# Patient Record
Sex: MALE | URBAN - METROPOLITAN AREA
[De-identification: names, ages, dates, MRNs, and addresses within clinical notes are randomized per-mention and may not be internally consistent; named-entity substitution may affect disease eponyms.]

---

## 2017-01-03 ENCOUNTER — APPOINTMENT (RX ONLY)
Dept: URBAN - METROPOLITAN AREA CLINIC 2 | Facility: CLINIC | Age: 42
Setting detail: DERMATOLOGY
End: 2017-01-03

## 2017-01-03 DIAGNOSIS — L81.4 OTHER MELANIN HYPERPIGMENTATION: ICD-10-CM

## 2017-01-03 DIAGNOSIS — D22 MELANOCYTIC NEVI: ICD-10-CM

## 2017-01-03 PROBLEM — D22.9 MELANOCYTIC NEVI, UNSPECIFIED: Status: ACTIVE | Noted: 2017-01-03

## 2017-01-03 PROBLEM — L57.8 OTHER SKIN CHANGES DUE TO CHRONIC EXPOSURE TO NONIONIZING RADIATION: Status: ACTIVE | Noted: 2017-01-03

## 2017-01-03 PROCEDURE — ? PATHOLOGY DISCUSSION

## 2017-01-03 PROCEDURE — ? COUNSELING

## 2017-01-03 PROCEDURE — ? SEPARATE AND IDENTIFIABLE DOCUMENTATION

## 2017-01-03 PROCEDURE — 99213 OFFICE O/P EST LOW 20 MIN: CPT

## 2024-11-15 ENCOUNTER — HOSPITAL ENCOUNTER (OUTPATIENT)
Age: 49
Discharge: HOME | End: 2024-11-15
Payer: COMMERCIAL

## 2024-11-15 DIAGNOSIS — D72.819: ICD-10-CM

## 2024-11-15 DIAGNOSIS — D64.9: Primary | ICD-10-CM

## 2024-11-15 LAB
ALANINE AMINOTRANSFERASE: 39 U/L (ref 10–49)
ALBUMIN, SERUM: 4.2 GM/DL (ref 3.5–5)
ALBUMIN/GLOBULIN RATIO: 1.8 (ref 1.2–2.2)
ALKALINE PHOSPHATASE: 68 U/L (ref 46–116)
ANION GAP: 3 (ref 7–16)
ASPARTATE AMINO TRANSFERASE: 21 U/L (ref 0–34)
BASOPHILS # (AUTO): 0.1 THOU/MM3 (ref 0–0.2)
BASOPHILS % (AUTO): 1 % (ref 0–2.5)
BILIRUBIN,TOTAL: 3.4 MG/DL (ref 0.3–1.2)
BLOOD UREA NITROGEN: 16 MG/DL (ref 9–23)
BUN/CREATININE RATIO: 16 RATIO (ref 12–20)
CALCIUM (CORRECTED): 9.3 MG/DL (ref 8.5–10.1)
CALCIUM: 9.3 MG/DL (ref 8.3–10.6)
CARBON DIOXIDE: 27.7 MMOL/L (ref 20–31)
CHLORIDE: 108 MMOL/L (ref 98–107)
CREATININE (COMPONENT): 1 MG/DL (ref 0.6–1.3)
EGFR: > 60 SEE NOTE
EOSINOPHILS # (AUTO): 0.1 THOU/MM3 (ref 0–0.5)
EOSINOPHILS % (AUTO): 3 % (ref 0–10)
GLOBULIN: 2.4 GM/DL (ref 2.3–3.5)
GLUCOSE: 92 MG/DL (ref 74–106)
HEMATOCRIT: 37.5 % (ref 41–53)
HEMOGLOBIN: 13.8 G/DL (ref 13.5–16)
IMM GRANULOCYTES # BLD AUTO: 0 THOU/MM3 (ref 0–0)
IMMATURE GRANULOCYTES % (AUTO): 0 % (ref 0–0)
LYMPHOCYTES # (AUTO): 1.4 THOU/MM3 (ref 1–4.8)
LYMPHOCYTES % (AUTO): 40 % (ref 10–50)
MEAN CORPUSCULAR HEMOGLOBIN: 32 PG (ref 25–35)
MEAN CORPUSCULAR HGB CONC: 36.8 G/DL (ref 31–37)
MEAN CORPUSCULAR VOLUME: 87 FL (ref 80–100)
MONOCYTES # (AUTO): 0.3 THOU/MM3 (ref 0–0.8)
MONOCYTES % (AUTO): 9 % (ref 0–12)
NEUTROPHILS # (AUTO): 1.6 THOU/MM3 (ref 1.8–7.7)
NEUTROPHILS % (AUTO): 47 % (ref 37–80)
NRBC BLD-RTO: 0 /100 WBC
NUCLEATED RED BLOOD CELL #: 0 THOU/MM3 (ref 0–0)
OSMOLALITY,CALCULATED: 278 (ref 275–295)
PLATELET COUNT: 162 THOU/MM3 (ref 140–440)
POTASSIUM: 4.7 MMOL/L (ref 3.4–5.1)
RDW STANDARD DEVIATION: 40.1 FL (ref 35.1–43.9)
RED BLOOD COUNT: 4.31 MILN/MM3 (ref 4.5–5.9)
SODIUM: 139 MMOL/L (ref 136–145)
TOTAL PROTEIN: 6.6 GM/DL (ref 5.7–8.2)
WHITE BLOOD COUNT: 3.5 THOU/MM3 (ref 3.8–10.6)

## 2024-11-15 PROCEDURE — 80053 COMPREHEN METABOLIC PANEL: CPT

## 2024-11-15 PROCEDURE — 36415 COLL VENOUS BLD VENIPUNCTURE: CPT

## 2024-11-15 PROCEDURE — 85025 COMPLETE CBC W/AUTO DIFF WBC: CPT

## 2024-11-19 ENCOUNTER — HOSPITAL ENCOUNTER (OUTPATIENT)
Dept: HOSPITAL 104 - SCTC | Age: 49
LOS: 11 days | Discharge: HOME | End: 2024-11-30
Payer: COMMERCIAL

## 2024-11-19 DIAGNOSIS — E80.6: ICD-10-CM

## 2024-11-19 DIAGNOSIS — D72.819: ICD-10-CM

## 2024-11-19 DIAGNOSIS — D64.9: Primary | ICD-10-CM

## 2024-11-19 DIAGNOSIS — E80.4: ICD-10-CM

## 2024-11-19 PROCEDURE — 99212 OFFICE O/P EST SF 10 MIN: CPT

## 2024-11-19 PROCEDURE — G0463 HOSPITAL OUTPT CLINIC VISIT: HCPCS

## 2024-12-13 ENCOUNTER — HOSPITAL ENCOUNTER (OUTPATIENT)
Age: 49
Discharge: HOME | End: 2024-12-13
Payer: COMMERCIAL

## 2024-12-13 DIAGNOSIS — D64.9: Primary | ICD-10-CM

## 2024-12-13 DIAGNOSIS — D72.829: ICD-10-CM

## 2024-12-13 LAB
BILIRUB CONJ SERPL-MCNC: 0.9 MG/DL (ref 0–0.3)
BILIRUBIN,DIRECT: 0.9 MG/DL (ref 0–0.3)
BILIRUBIN,TOTAL: 3.1 MG/DL (ref 0.3–1.2)
FERRITIN: 108 NG/ML (ref 10.5–307.3)
FOLATE: 18.71 NG/ML (ref 5.38–?)
HGB RETIC QN AUTO: 37.5 PG (ref 28–35)
IMMATURE RETICULOCYTE FRACTION: 7.7 % (ref 2.3–13.4)
IRON SATN MFR SERPL: 31 % (ref 20–55)
IRON: 97 MCG/DL (ref 65–175)
LDH (LACTATE DEHYDROGENASE): 185 U/L (ref 120–246)
RETICULOCYTE % (AUTO): 1.4 % (ref 0.5–1.5)
RETICULOCYTE ABSOLUTE AUTO: 60.3 BILN/L (ref 25–75)
TOTAL IRON BINDING CAPACITY: 305 MCG/DL (ref 250–425)
UNSATURATED IRON BINDING: 208 (ref 225–295)
VITAMIN B12: 401 PG/ML (ref 211–911)

## 2024-12-13 PROCEDURE — 83010 ASSAY OF HAPTOGLOBIN QUANT: CPT

## 2024-12-13 PROCEDURE — 82247 BILIRUBIN TOTAL: CPT

## 2024-12-13 PROCEDURE — 85014 HEMATOCRIT: CPT

## 2024-12-13 PROCEDURE — 82248 BILIRUBIN DIRECT: CPT

## 2024-12-13 PROCEDURE — 36415 COLL VENOUS BLD VENIPUNCTURE: CPT

## 2024-12-13 PROCEDURE — 82607 VITAMIN B-12: CPT

## 2024-12-13 PROCEDURE — 85041 AUTOMATED RBC COUNT: CPT

## 2024-12-13 PROCEDURE — 82728 ASSAY OF FERRITIN: CPT

## 2024-12-13 PROCEDURE — 83540 ASSAY OF IRON: CPT

## 2024-12-13 PROCEDURE — 83550 IRON BINDING TEST: CPT

## 2024-12-13 PROCEDURE — 85018 HEMOGLOBIN: CPT

## 2024-12-13 PROCEDURE — 82746 ASSAY OF FOLIC ACID SERUM: CPT

## 2024-12-13 PROCEDURE — 84403 ASSAY OF TOTAL TESTOSTERONE: CPT

## 2024-12-13 PROCEDURE — 83615 LACTATE (LD) (LDH) ENZYME: CPT

## 2024-12-13 PROCEDURE — 83020 HEMOGLOBIN ELECTROPHORESIS: CPT

## 2024-12-13 PROCEDURE — 85046 RETICYTE/HGB CONCENTRATE: CPT

## 2024-12-20 LAB
HAPTOGLOBIN*: <10 MG/DL (ref 43–212)
HEMOGLOBINOPATHY MCH: 31.8 PG (ref 27–33)
HEMOGLOBINOPATHY MCV: 93.6 FL (ref 80–100)
HEMOGLOBINOPATHY RDW: 12.9 % (ref 11–15)
Lab: (no result)
Lab: 0 % (ref ?–2)
Lab: 13.4 G/DL (ref 13.2–17.1)
Lab: 2.6 % (ref 2–3.2)
Lab: 39.4 % (ref 38.5–50)
Lab: 4.21 MILLION/UL (ref 4.2–5.8)
Lab: 97.4 %
TESTOSTERONE,TOTAL*: 841 NG/DL (ref 250–1100)

## 2024-12-26 ENCOUNTER — HOSPITAL ENCOUNTER (OUTPATIENT)
Dept: HOSPITAL 104 - CDIM | Age: 49
Discharge: HOME | End: 2024-12-26
Payer: COMMERCIAL

## 2024-12-26 DIAGNOSIS — D64.9: Primary | ICD-10-CM

## 2024-12-26 PROCEDURE — 76700 US EXAM ABDOM COMPLETE: CPT

## 2024-12-26 NOTE — XR_ITS
Examination: 
  
Abdomen sonogram, complete  
  
Date and time of exam: December 26, 2024 0716 hours 
  
INDICATIONS: Abdominal pain history, cholecystectomy March 2024. 
  
Technique: 
Multiple real-time grayscale transabdominal sonographic images of the abdomen 
have been obtained. 
  
Findings: 
  
Absent gallbladder 
Normal common bile duct 0.3 cm 
Pancreatic head 2.2 cm 
Aorta not enlarged 
Liver normal size 13.1 cm 
Normal hepatopedal portal venous flow 
Patent IVC 
Right kidney 10.4 x 6.4 x 6.3 cm cortex 1.3 cm 
Left kidney 11.0 x 4.7 x 5.6 cm renal cortex 1.2 cm 
Mild to moderate bilateral renal parenchymal scar formation 
13 mm cyst anterior left kidney 
No hydronephrosis 
Spleen 9.5 cm 
  
IMPRESSION: 
  
Negative for hepatosplenomegaly

## 2024-12-31 ENCOUNTER — HOSPITAL ENCOUNTER (OUTPATIENT)
Dept: HOSPITAL 104 - SCTC | Age: 49
Discharge: HOME | End: 2024-12-31
Payer: COMMERCIAL

## 2024-12-31 DIAGNOSIS — E80.6: ICD-10-CM

## 2024-12-31 DIAGNOSIS — Z86.2: ICD-10-CM

## 2024-12-31 DIAGNOSIS — Z09: Primary | ICD-10-CM

## 2024-12-31 DIAGNOSIS — E80.4: ICD-10-CM

## 2024-12-31 PROCEDURE — 99212 OFFICE O/P EST SF 10 MIN: CPT

## 2024-12-31 PROCEDURE — G0463 HOSPITAL OUTPT CLINIC VISIT: HCPCS

## 2025-02-13 ENCOUNTER — HOSPITAL ENCOUNTER (OUTPATIENT)
Dept: HOSPITAL 104 - SCTC | Age: 50
LOS: 15 days | Discharge: HOME | End: 2025-02-28
Payer: COMMERCIAL

## 2025-02-13 ENCOUNTER — HOSPITAL ENCOUNTER (OUTPATIENT)
Dept: HOSPITAL 104 - SCTO | Age: 50
Discharge: HOME | End: 2025-02-13
Payer: COMMERCIAL

## 2025-02-13 DIAGNOSIS — D64.9: Primary | ICD-10-CM

## 2025-02-13 DIAGNOSIS — E80.4: ICD-10-CM

## 2025-02-13 DIAGNOSIS — D72.829: ICD-10-CM

## 2025-02-13 DIAGNOSIS — D72.819: Primary | ICD-10-CM

## 2025-02-13 LAB
ALANINE AMINOTRANSFERASE: 51 U/L (ref 10–49)
ALBUMIN, SERUM: 4.3 GM/DL (ref 3.5–5)
ALBUMIN/GLOBULIN RATIO: 1.7 (ref 1.2–2.2)
ALKALINE PHOSPHATASE: 67 U/L (ref 46–116)
ANION GAP: 6 (ref 7–16)
ASPARTATE AMINO TRANSFERASE: 30 U/L (ref 0–34)
BASOPHILS # (AUTO): 0 THOU/MM3 (ref 0–0.2)
BASOPHILS % (AUTO): 1 % (ref 0–2.5)
BILIRUBIN,TOTAL: 4.4 MG/DL (ref 0.3–1.2)
BLOOD UREA NITROGEN: 17 MG/DL (ref 9–23)
BUN/CREATININE RATIO: 15 RATIO (ref 12–20)
CALCIUM (CORRECTED): 9.8 MG/DL (ref 8.5–10.1)
CALCIUM: 9.8 MG/DL (ref 8.3–10.6)
CARBON DIOXIDE: 28.8 MMOL/L (ref 20–31)
CHLORIDE: 109 MMOL/L (ref 98–107)
CREATININE (COMPONENT): 1.1 MG/DL (ref 0.6–1.3)
EGFR: > 60 SEE NOTE
EOSINOPHILS # (AUTO): 0.1 THOU/MM3 (ref 0–0.5)
EOSINOPHILS % (AUTO): 2 % (ref 0–10)
FERRITIN: 117 NG/ML (ref 10.5–307.3)
FOLATE: 16.77 NG/ML (ref 5.38–?)
GLOBULIN: 2.5 GM/DL (ref 2.3–3.5)
GLUCOSE: 96 MG/DL (ref 74–106)
HEMATOCRIT: 37.9 % (ref 41–53)
HEMOGLOBIN: 14 G/DL (ref 13.5–16)
HGB RETIC QN AUTO: 35.6 PG (ref 28–35)
IMM GRANULOCYTES # BLD AUTO: 0.01 THOU/MM3 (ref 0–0)
IMMATURE GRANULOCYTES % (AUTO): 0 % (ref 0–0)
IMMATURE RETICULOCYTE FRACTION: 5.6 % (ref 2.3–13.4)
IRON SATN MFR SERPL: 67 % (ref 20–55)
IRON: 214 MCG/DL (ref 65–175)
LDH (LACTATE DEHYDROGENASE): 168 U/L (ref 120–246)
LYMPHOCYTES # (AUTO): 1.4 THOU/MM3 (ref 1–4.8)
LYMPHOCYTES % (AUTO): 39 % (ref 10–50)
MEAN CORPUSCULAR HEMOGLOBIN: 32.3 PG (ref 25–35)
MEAN CORPUSCULAR HGB CONC: 36.9 G/DL (ref 31–37)
MEAN CORPUSCULAR VOLUME: 87 FL (ref 80–100)
MONOCYTES # (AUTO): 0.3 THOU/MM3 (ref 0–0.8)
MONOCYTES % (AUTO): 8 % (ref 0–12)
NEUTROPHILS # (AUTO): 1.8 THOU/MM3 (ref 1.8–7.7)
NEUTROPHILS % (AUTO): 50 % (ref 37–80)
NRBC BLD-RTO: 0 /100 WBC
NUCLEATED RED BLOOD CELL #: 0 THOU/MM3 (ref 0–0)
OSMOLALITY,CALCULATED: 288 (ref 275–295)
PLATELET COUNT: 160 THOU/MM3 (ref 140–440)
POTASSIUM: 4.7 MMOL/L (ref 3.4–5.1)
RDW STANDARD DEVIATION: 39.3 FL (ref 35.1–43.9)
RED BLOOD COUNT: 4.34 MILN/MM3 (ref 4.5–5.9)
RETICULOCYTE % (AUTO): 1.3 % (ref 0.5–1.5)
RETICULOCYTE ABSOLUTE AUTO: 54.7 BILN/L (ref 25–75)
SODIUM: 144 MMOL/L (ref 136–145)
TOTAL IRON BINDING CAPACITY: 315 MCG/DL (ref 250–425)
TOTAL PROTEIN: 6.8 GM/DL (ref 5.7–8.2)
UNSATURATED IRON BINDING: 101 (ref 225–295)
VITAMIN B12: 463 PG/ML (ref 211–911)
WHITE BLOOD COUNT: 3.6 THOU/MM3 (ref 3.8–10.6)

## 2025-02-13 PROCEDURE — 82607 VITAMIN B-12: CPT

## 2025-02-13 PROCEDURE — 83540 ASSAY OF IRON: CPT

## 2025-02-13 PROCEDURE — 36415 COLL VENOUS BLD VENIPUNCTURE: CPT

## 2025-02-13 PROCEDURE — 83010 ASSAY OF HAPTOGLOBIN QUANT: CPT

## 2025-02-13 PROCEDURE — 83615 LACTATE (LD) (LDH) ENZYME: CPT

## 2025-02-13 PROCEDURE — 82728 ASSAY OF FERRITIN: CPT

## 2025-02-13 PROCEDURE — 85025 COMPLETE CBC W/AUTO DIFF WBC: CPT

## 2025-02-13 PROCEDURE — 83550 IRON BINDING TEST: CPT

## 2025-02-13 PROCEDURE — G0463 HOSPITAL OUTPT CLINIC VISIT: HCPCS

## 2025-02-13 PROCEDURE — 99212 OFFICE O/P EST SF 10 MIN: CPT

## 2025-02-13 PROCEDURE — 82746 ASSAY OF FOLIC ACID SERUM: CPT

## 2025-02-13 PROCEDURE — 85046 RETICYTE/HGB CONCENTRATE: CPT

## 2025-02-13 PROCEDURE — 80053 COMPREHEN METABOLIC PANEL: CPT

## 2025-02-20 LAB — HAPTOGLOBIN*: <10 MG/DL (ref 43–212)

## 2025-03-11 ENCOUNTER — HOSPITAL ENCOUNTER (OUTPATIENT)
Dept: HOSPITAL 104 - SCTO | Age: 50
Discharge: HOME | End: 2025-03-11
Payer: COMMERCIAL

## 2025-03-11 DIAGNOSIS — D64.9: Primary | ICD-10-CM

## 2025-03-11 DIAGNOSIS — D72.829: ICD-10-CM

## 2025-03-11 LAB
ALANINE AMINOTRANSFERASE: 64 U/L (ref 10–49)
ALBUMIN, SERUM: 4.5 GM/DL (ref 3.5–5)
ALBUMIN/GLOBULIN RATIO: 1.7 (ref 1.2–2.2)
ALKALINE PHOSPHATASE: 71 U/L (ref 46–116)
ANION GAP: 8 (ref 7–16)
ASPARTATE AMINO TRANSFERASE: 32 U/L (ref 0–34)
BASOPHILS # (AUTO): 0 THOU/MM3 (ref 0–0.2)
BASOPHILS % (AUTO): 0 % (ref 0–2.5)
BILIRUBIN,TOTAL: 5 MG/DL (ref 0.3–1.2)
BLOOD UREA NITROGEN: 16 MG/DL (ref 9–23)
BUN/CREATININE RATIO: 15 RATIO (ref 12–20)
CALCIUM (CORRECTED): 9.9 MG/DL (ref 8.5–10.1)
CALCIUM: 9.9 MG/DL (ref 8.3–10.6)
CARBON DIOXIDE: 27.2 MMOL/L (ref 20–31)
CHLORIDE: 108 MMOL/L (ref 98–107)
CREATININE (COMPONENT): 1.1 MG/DL (ref 0.6–1.3)
EGFR: > 60 SEE NOTE
EOSINOPHILS # (AUTO): 0 THOU/MM3 (ref 0–0.5)
EOSINOPHILS % (AUTO): 0 % (ref 0–10)
FERRITIN: 115 NG/ML (ref 10.5–307.3)
GLOBULIN: 2.7 GM/DL (ref 2.3–3.5)
GLUCOSE: 108 MG/DL (ref 74–106)
HEMATOCRIT: 37.3 % (ref 41–53)
HEMOGLOBIN: 13.9 G/DL (ref 13.5–16)
HGB RETIC QN AUTO: 37.2 PG (ref 28–35)
IMM GRANULOCYTES # BLD AUTO: 0.03 THOU/MM3 (ref 0–0)
IMMATURE GRANULOCYTES % (AUTO): 0 % (ref 0–0)
IMMATURE RETICULOCYTE FRACTION: 7.4 % (ref 2.3–13.4)
IRON SATN MFR SERPL: 70 % (ref 20–55)
IRON: 236 MCG/DL (ref 65–175)
LDH (LACTATE DEHYDROGENASE): 198 U/L (ref 120–246)
LYMPHOCYTES # (AUTO): 1.4 THOU/MM3 (ref 1–4.8)
LYMPHOCYTES % (AUTO): 12 % (ref 10–50)
MEAN CORPUSCULAR HEMOGLOBIN: 32 PG (ref 25–35)
MEAN CORPUSCULAR HGB CONC: 37.3 G/DL (ref 31–37)
MEAN CORPUSCULAR VOLUME: 86 FL (ref 80–100)
MONOCYTES # (AUTO): 0.6 THOU/MM3 (ref 0–0.8)
MONOCYTES % (AUTO): 5 % (ref 0–12)
NEUTROPHILS # (AUTO): 9.8 THOU/MM3 (ref 1.8–7.7)
NEUTROPHILS % (AUTO): 83 % (ref 37–80)
NRBC BLD-RTO: 0 /100 WBC
NUCLEATED RED BLOOD CELL #: 0 THOU/MM3 (ref 0–0)
OSMOLALITY,CALCULATED: 287 (ref 275–295)
PLATELET COUNT: 174 THOU/MM3 (ref 140–440)
POTASSIUM: 4.3 MMOL/L (ref 3.4–5.1)
RDW STANDARD DEVIATION: 39.1 FL (ref 35.1–43.9)
RED BLOOD COUNT: 4.34 MILN/MM3 (ref 4.5–5.9)
RETICULOCYTE % (AUTO): 1.7 % (ref 0.5–1.5)
RETICULOCYTE ABSOLUTE AUTO: 73.3 BILN/L (ref 25–75)
SODIUM: 143 MMOL/L (ref 136–145)
TOTAL IRON BINDING CAPACITY: 337 MCG/DL (ref 250–425)
TOTAL PROTEIN: 7.2 GM/DL (ref 5.7–8.2)
UNSATURATED IRON BINDING: 101 (ref 225–295)
WHITE BLOOD COUNT: 11.8 THOU/MM3 (ref 3.8–10.6)

## 2025-03-11 PROCEDURE — 83550 IRON BINDING TEST: CPT

## 2025-03-11 PROCEDURE — 80053 COMPREHEN METABOLIC PANEL: CPT

## 2025-03-11 PROCEDURE — 36415 COLL VENOUS BLD VENIPUNCTURE: CPT

## 2025-03-11 PROCEDURE — 85025 COMPLETE CBC W/AUTO DIFF WBC: CPT

## 2025-03-11 PROCEDURE — 83615 LACTATE (LD) (LDH) ENZYME: CPT

## 2025-03-11 PROCEDURE — 83010 ASSAY OF HAPTOGLOBIN QUANT: CPT

## 2025-03-11 PROCEDURE — 82728 ASSAY OF FERRITIN: CPT

## 2025-03-11 PROCEDURE — 85046 RETICYTE/HGB CONCENTRATE: CPT

## 2025-03-11 PROCEDURE — 83540 ASSAY OF IRON: CPT

## 2025-03-13 ENCOUNTER — HOSPITAL ENCOUNTER (OUTPATIENT)
Dept: HOSPITAL 104 - SCTC | Age: 50
LOS: 18 days | Discharge: HOME | End: 2025-03-31
Payer: COMMERCIAL

## 2025-03-13 DIAGNOSIS — E80.4: ICD-10-CM

## 2025-03-13 DIAGNOSIS — Z86.2: ICD-10-CM

## 2025-03-13 DIAGNOSIS — E80.6: Primary | ICD-10-CM

## 2025-03-13 PROCEDURE — G0463 HOSPITAL OUTPT CLINIC VISIT: HCPCS

## 2025-03-13 PROCEDURE — 99212 OFFICE O/P EST SF 10 MIN: CPT

## 2025-03-19 LAB — HAPTOGLOBIN*: 17 MG/DL (ref 43–212)

## 2025-04-14 ENCOUNTER — HOSPITAL ENCOUNTER (OUTPATIENT)
Dept: HOSPITAL 104 - SCTO | Age: 50
Discharge: HOME | End: 2025-04-14
Payer: COMMERCIAL

## 2025-04-14 DIAGNOSIS — D72.829: ICD-10-CM

## 2025-04-14 DIAGNOSIS — D64.9: Primary | ICD-10-CM

## 2025-04-14 LAB
ALBUMIN/GLOB SERPL: 1.6 {RATIO} (ref 1.2–2.2)
ALP SERPL-CCNC: 78 U/L (ref 46–116)
ALT SERPL-CCNC: 38 U/L (ref 10–49)
ANION GAP SERPL CALC-SCNC: 6 MMOL/L (ref 7–16)
AST SERPL-CCNC: 24 U/L (ref 0–34)
BASOPHILS NFR BLD AUTO: 1 % (ref 0–2.5)
BILIRUB SERPL-MCNC: 3.5 MG/DL (ref 0.3–1.2)
BUN SERPL-MCNC: 14 MG/DL (ref 9–23)
BUN/CREAT SERPL: 13 RATIO (ref 12–20)
CALCIUM ALBUM COR SERPL-MCNC: 9.1 MG/DL (ref 8.5–10.1)
CALCIUM SERPL-MCNC: 9.1 MG/DL (ref 8.3–10.6)
CHLORIDE SERPL-SCNC: 110 MMOL/L (ref 98–107)
CO2 SERPL-SCNC: 28.2 MMOL/L (ref 20–31)
CREAT SERPL-MCNC: 1.1 MG/DL (ref 0.6–1.3)
EGFR: > 60 SEE NOTE
EOSINOPHIL # BLD AUTO: 0.1 THOU/MM3 (ref 0–0.5)
EOSINOPHIL NFR BLD AUTO: 2 % (ref 0–10)
FERRITIN SERPL-MCNC: 91 NG/ML (ref 10.5–307.3)
GLOBULIN SER CALC-MCNC: 2.5 GM/DL (ref 2.3–3.5)
GLUCOSE SERPL-MCNC: 84 MG/DL (ref 74–106)
HCT VFR BLD AUTO: 36.9 % (ref 41–53)
HGB BLD-MCNC: 13.7 G/DL (ref 13.5–16)
HGB RETIC QN AUTO: 36.9 PG (ref 28–35)
IMM GRANULOCYTES # BLD AUTO: 0.01 THOU/MM3 (ref 0–0)
IMM GRANULOCYTES NFR BLD AUTO: 0 % (ref 0–0)
IMM RETICS NFR: 8.3 % (ref 2.3–13.4)
IRON SATN MFR SERPL: 36 % (ref 20–55)
IRON SERPL-MCNC: 117 MCG/DL (ref 65–175)
LDH SERPL-CCNC: 174 U/L (ref 120–246)
LYMPHOCYTES # BLD AUTO: 1.1 THOU/MM3 (ref 1–4.8)
LYMPHOCYTES NFR BLD AUTO: 38 % (ref 10–50)
MCH RBC QN AUTO: 32.2 PG (ref 25–35)
MCHC RBC AUTO-ENTMCNC: 37.1 G/DL (ref 31–37)
MCV RBC AUTO: 87 FL (ref 80–100)
MONOCYTES # BLD AUTO: 0.3 THOU/MM3 (ref 0–0.8)
MONOCYTES NFR BLD AUTO: 11 % (ref 0–12)
NEUTROPHILS # BLD AUTO: 1.4 THOU/MM3 (ref 1.8–7.7)
NEUTROPHILS NFR BLD AUTO: 48 % (ref 37–80)
NRBC BLD-RTO: 0 /100 WBC
OSMOLALITY,CALCULATED: 286 (ref 275–295)
PLATELET COUNT: 179 THOU/MM3 (ref 140–440)
POTASSIUM: 5.1 MMOL/L (ref 3.4–5.1)
RDW STANDARD DEVIATION: 39.9 FL (ref 35.1–43.9)
RED BLOOD COUNT: 4.26 MILN/MM3 (ref 4.5–5.9)
RETICS # AUTO: 62.2 BILN/L (ref 25–75)
RETICS/RBC NFR AUTO: 1.5 % (ref 0.5–1.5)
SODIUM: 144 MMOL/L (ref 136–145)
TOTAL IRON BINDING CAPACITY: 321 MCG/DL (ref 250–425)
TOTAL PROTEIN: 6.5 GM/DL (ref 5.7–8.2)
UNSATURATED IRON BINDING: 204 (ref 225–295)

## 2025-04-14 PROCEDURE — 85025 COMPLETE CBC W/AUTO DIFF WBC: CPT

## 2025-04-14 PROCEDURE — 85046 RETICYTE/HGB CONCENTRATE: CPT

## 2025-04-14 PROCEDURE — 36415 COLL VENOUS BLD VENIPUNCTURE: CPT

## 2025-04-14 PROCEDURE — 83550 IRON BINDING TEST: CPT

## 2025-04-14 PROCEDURE — 80053 COMPREHEN METABOLIC PANEL: CPT

## 2025-04-14 PROCEDURE — 82728 ASSAY OF FERRITIN: CPT

## 2025-04-14 PROCEDURE — 83540 ASSAY OF IRON: CPT

## 2025-04-14 PROCEDURE — 83010 ASSAY OF HAPTOGLOBIN QUANT: CPT

## 2025-04-14 PROCEDURE — 83615 LACTATE (LD) (LDH) ENZYME: CPT

## 2025-04-21 LAB — HAPTOGLOB SERPL-MCNC: 9 MG/DL (ref 43–212)

## 2025-06-16 ENCOUNTER — HOSPITAL ENCOUNTER (OUTPATIENT)
Dept: HOSPITAL 104 - SCTO | Age: 50
Discharge: HOME | End: 2025-06-16
Payer: COMMERCIAL

## 2025-06-16 ENCOUNTER — HOSPITAL ENCOUNTER (OUTPATIENT)
Dept: HOSPITAL 104 - SMRI | Age: 50
Discharge: HOME | End: 2025-06-16
Payer: COMMERCIAL

## 2025-06-16 DIAGNOSIS — D72.829: ICD-10-CM

## 2025-06-16 DIAGNOSIS — D64.9: Primary | ICD-10-CM

## 2025-06-16 LAB
ALBUMIN SERPL-MCNC: 4.1 GM/DL (ref 3.5–5)
ALBUMIN/GLOB SERPL: 1.7 {RATIO} (ref 1.2–2.2)
ALP SERPL-CCNC: 73 U/L (ref 46–116)
ALT SERPL-CCNC: 42 U/L (ref 10–49)
ANION GAP SERPL CALC-SCNC: 9 MMOL/L (ref 7–16)
AST SERPL-CCNC: 32 U/L (ref 0–34)
BASOPHILS # BLD AUTO: 0 THOU/MM3 (ref 0–0.2)
BASOPHILS NFR BLD AUTO: 1 % (ref 0–2.5)
BILIRUB SERPL-MCNC: 3.2 MG/DL (ref 0.3–1.2)
BUN SERPL-MCNC: 13 MG/DL (ref 9–23)
BUN/CREAT SERPL: 13 RATIO (ref 12–20)
CALCIUM ALBUM COR SERPL-MCNC: 8.8 MG/DL (ref 8.5–10.1)
CALCIUM SERPL-MCNC: 8.8 MG/DL (ref 8.3–10.6)
CHLORIDE SERPL-SCNC: 109 MMOL/L (ref 98–107)
CO2 SERPL-SCNC: 26.4 MMOL/L (ref 20–31)
CREAT CL PREDICTED SERPL C-G-VRATE: (no result) ML/MIN (ref 60–?)
CREAT SERPL-MCNC: 1 MG/DL (ref 0.6–1.3)
EGFR: > 60 SEE NOTE
EOSINOPHIL # BLD AUTO: 0 THOU/MM3 (ref 0–0.5)
EOSINOPHIL NFR BLD AUTO: 1 % (ref 0–10)
FERRITIN SERPL-MCNC: 93 NG/ML (ref 10.5–307.3)
FOLATE SERPL-MCNC: 15.66 NG/ML (ref 5.38–?)
GLOBULIN SER CALC-MCNC: 2.4 GM/DL (ref 2.3–3.5)
GLUCOSE SERPL-MCNC: 100 MG/DL (ref 74–106)
HCT VFR BLD AUTO: 37.1 % (ref 41–53)
HGB BLD-MCNC: 13.7 G/DL (ref 13.5–16)
HGB RETIC QN AUTO: 37.1 PG (ref 28–35)
IMM GRANULOCYTES # BLD AUTO: 0.01 THOU/MM3 (ref 0–0)
IMM GRANULOCYTES NFR BLD AUTO: 0 % (ref 0–0)
IMM RETICS NFR: 5.4 % (ref 2.3–13.4)
IRON SATN MFR SERPL: 43 % (ref 20–55)
IRON SERPL-MCNC: 141 MCG/DL (ref 65–175)
LYMPHOCYTES # BLD AUTO: 1.3 THOU/MM3 (ref 1–4.8)
LYMPHOCYTES NFR BLD AUTO: 36 % (ref 10–50)
MCH RBC QN AUTO: 31.9 PG (ref 25–35)
MCHC RBC AUTO-ENTMCNC: 36.9 G/DL (ref 31–37)
MCV RBC AUTO: 87 FL (ref 80–100)
MONOCYTES # BLD AUTO: 0.3 THOU/MM3 (ref 0–0.8)
MONOCYTES NFR BLD AUTO: 9 % (ref 0–12)
NEUTROPHILS # BLD AUTO: 1.9 THOU/MM3 (ref 1.8–7.7)
NEUTROPHILS NFR BLD AUTO: 53 % (ref 37–80)
NRBC # BLD: 0 THOU/MM3 (ref 0–0)
NRBC BLD-RTO: 0 /100 WBC
OSMOLALITY,CALCULATED: 286 (ref 275–295)
PLATELET COUNT: 162 THOU/MM3 (ref 140–440)
POTASSIUM: 4.9 MMOL/L (ref 3.4–5.1)
RDW STANDARD DEVIATION: 39.6 FL (ref 35.1–43.9)
RED BLOOD COUNT: 4.29 MILN/MM3 (ref 4.5–5.9)
RETICS # AUTO: 64.4 BILN/L (ref 25–75)
RETICS/RBC NFR AUTO: 1.5 % (ref 0.5–1.5)
SODIUM: 144 MMOL/L (ref 136–145)
TOTAL IRON BINDING CAPACITY: 322 MCG/DL (ref 250–425)
TOTAL PROTEIN: 6.5 GM/DL (ref 5.7–8.2)
UNSATURATED IRON BINDING: 181 (ref 225–295)
VITAMIN B12: 440 PG/ML (ref 211–911)
WHITE BLOOD COUNT: 3.5 THOU/MM3 (ref 3.8–10.6)

## 2025-06-16 PROCEDURE — 83010 ASSAY OF HAPTOGLOBIN QUANT: CPT

## 2025-06-16 PROCEDURE — 83550 IRON BINDING TEST: CPT

## 2025-06-16 PROCEDURE — 36415 COLL VENOUS BLD VENIPUNCTURE: CPT

## 2025-06-16 PROCEDURE — 82746 ASSAY OF FOLIC ACID SERUM: CPT

## 2025-06-16 PROCEDURE — 85046 RETICYTE/HGB CONCENTRATE: CPT

## 2025-06-16 PROCEDURE — 80053 COMPREHEN METABOLIC PANEL: CPT

## 2025-06-16 PROCEDURE — 85025 COMPLETE CBC W/AUTO DIFF WBC: CPT

## 2025-06-16 PROCEDURE — 74183 MRI ABD W/O CNTR FLWD CNTR: CPT

## 2025-06-16 PROCEDURE — 82607 VITAMIN B-12: CPT

## 2025-06-16 PROCEDURE — 82728 ASSAY OF FERRITIN: CPT

## 2025-06-16 PROCEDURE — 83540 ASSAY OF IRON: CPT

## 2025-06-23 LAB — HAPTOGLOB SERPL-MCNC: 15 MG/DL (ref 43–212)
